# Patient Record
Sex: MALE
[De-identification: names, ages, dates, MRNs, and addresses within clinical notes are randomized per-mention and may not be internally consistent; named-entity substitution may affect disease eponyms.]

---

## 2019-03-24 ENCOUNTER — HOSPITAL ENCOUNTER (EMERGENCY)
Dept: HOSPITAL 5 - ED | Age: 24
Discharge: HOME | End: 2019-03-24
Payer: COMMERCIAL

## 2019-03-24 VITALS — DIASTOLIC BLOOD PRESSURE: 65 MMHG | SYSTOLIC BLOOD PRESSURE: 106 MMHG

## 2019-03-24 DIAGNOSIS — F17.200: ICD-10-CM

## 2019-03-24 DIAGNOSIS — Z20.2: Primary | ICD-10-CM

## 2019-03-24 PROCEDURE — 87591 N.GONORRHOEAE DNA AMP PROB: CPT

## 2019-03-24 PROCEDURE — 99282 EMERGENCY DEPT VISIT SF MDM: CPT

## 2019-03-24 PROCEDURE — 96372 THER/PROPH/DIAG INJ SC/IM: CPT

## 2019-03-24 NOTE — EMERGENCY DEPARTMENT REPORT
ED Dysuria HPI





- HPI


Chief Complaint: Urogenital-Male


Stated Complaint: TREATMENT FOR GONORRHEA


Time Seen by Provider: 03/24/19 13:40


Location of Discomfort: Suprapubic


Symptoms: Dysuria: No, Frequency: No, Suprapubic Pain: No, Flank Pain: No, 

Fever: No, Hematuria: No, Abdominal Pain: No, Previous UTI's: No


Other History: KNOWN EXPOSURE TO GONORRHEA





ED Review of Systems


ROS: 


Stated complaint: TREATMENT FOR GONORRHEA


Other details as noted in HPI





Comment: All other systems reviewed and negative


Constitutional: denies: chills


Eyes: denies: eye pain


ENT: denies: throat pain


Cardiovascular: denies: dyspnea on exertion


Endocrine: denies: intolerance to cold


Gastrointestinal: denies: nausea


Genitourinary: as per HPI


Musculoskeletal: denies: back pain


Skin: denies: lesions


Neurological: denies: headache


Hematological/Lymphatic: denies: easy bleeding





ED Past Medical Hx





- Past Medical History


Previous Medical History?: No





- Surgical History


Past Surgical History?: No





- Social History


Smoking Status: Current Every Day Smoker


Substance Use Type: None





Dysuria Exam





- Exam


General: 


Vital signs noted. No distress. Alert and acting appropriately.





Exam: Yes Moist Mucous Membranes, No CVA Tenderness, No Abdominal Tenderness, No

Rigidity or Guarding





ED Course


                                   Vital Signs











  03/24/19





  13:40


 


Temperature 98 F


 


Pulse Rate 89


 


Respiratory 16





Rate 


 


Blood Pressure 106/65


 


O2 Sat by Pulse 100





Oximetry 














ED Medical Decision Making





- Medical Decision Making





EXPOSURE TO STI





TX WITH ROCEPHIN AND AZITHRO


EDUCATED ON SAFE SEX





DC HOME





                                   Vital Signs











  03/24/19





  13:40


 


Temperature 98 F


 


Pulse Rate 89


 


Respiratory 16





Rate 


 


Blood Pressure 106/65


 


O2 Sat by Pulse 100





Oximetry 











Critical care attestation.: 


If time is entered above; I have spent that time in minutes in the direct care 

of this critically ill patient, excluding procedure time.








ED Disposition


Clinical Impression: 


 STD exposure





Disposition: DC-01 TO HOME OR SELFCARE


Is pt being admited?: No


Does the pt Need Aspirin: No


Condition: Stable


Referrals: 


ESTEE WHITEHEAD MD [Primary Care Provider] - 3-5 Days


Time of Disposition: 13:54

## 2019-03-24 NOTE — EMERGENCY DEPARTMENT REPORT
Chief Complaint: Urogenital-Male


Stated Complaint: TREATMENT FOR GONORRHEA


Time Seen by Provider: 03/24/19 13:40





- HPI


History of Present Illness: 





PARTNER HAS GONN.





PMH


NONE





PSH


WRIST





RX


NONE





DENIES CIG/ETOH/DRUGS





NO DISCHARGE





MSE COMPLETED








MSE screening note: 


Focused history and physical exam performed.


Due to findings the following was ordered:











ED Disposition for MSE


Condition: Stable